# Patient Record
(demographics unavailable — no encounter records)

---

## 2024-11-07 NOTE — REASON FOR VISIT
[Follow-up] : a follow-up of an existing diagnosis [FreeTextEntry1] : For chronic epigastric pain and early satiety

## 2024-11-07 NOTE — REVIEW OF SYSTEMS
[As Noted in HPI] : as noted in HPI [Abdominal Pain] : abdominal pain [Negative] : Heme/Lymph [Vomiting] : no vomiting [Constipation] : no constipation [Diarrhea] : no diarrhea [Heartburn] : no heartburn [Melena (black stool)] : no melena [Bleeding] : no bleeding [Fecal Incontinence (soiling)] : no fecal incontinence [Bloating (gassiness)] : no bloating [FreeTextEntry7] : Early satiety.

## 2024-11-07 NOTE — ASSESSMENT
[FreeTextEntry1] : Impression: Chronic epigastric pain and early satiety with new onset thrombocytopenia rule out possible cirrhosis not actually seen on recent abdominal sonogram rule out possible ulcer disease and/or gastritis and/or upper GI neoplasm which is unlikely given the fact that he had a upper endoscopy done 6 months ago that was negative.  Recommendations: Patient is to be referred to hematology for further evaluation of his thrombocytopenia.  CT of the abdomen and pelvis with oral and IV contrast ordered for further evaluation of his symptoms to rule out possible abdominal weakness and/or cirrhosis.  Repeat EGD also advised for further evaluation of his new onset epigastric pain and early satiety.  Risk versus benefits of upper endoscopy and intravenous sedation, and alternative testing such as upper GI series, were individually explained to the patient today who appeared to understand all of the above and was agreeable to proceeding with repeat upper endoscopy.  His ASA classification is 2 and he is medically optimized for the proposed upper endoscopy and seemed to understand all of the above instructions, information, and management plan.

## 2024-11-07 NOTE — HISTORY OF PRESENT ILLNESS
[de-identified] : 6 months ago.  Negative EGD. [FreeTextEntry1] : 6 months ago.  Colonoscopy. [de-identified] : Heterogeneous coarsened echogenic hepatic echotexture.

## 2024-11-07 NOTE — PHYSICAL EXAM
[Alert] : alert [Normal Voice/Communication] : normal voice/communication [Healthy Appearing] : healthy appearing [No Acute Distress] : no acute distress [Sclera] : the sclera and conjunctiva were normal [Hearing Threshold Finger Rub Not Cherry] : hearing was normal [Normal Lips/Gums] : the lips and gums were normal [Oropharynx] : the oropharynx was normal [Normal Appearance] : the appearance of the neck was normal [No Neck Mass] : no neck mass was observed [No Respiratory Distress] : no respiratory distress [No Acc Muscle Use] : no accessory muscle use [Respiration, Rhythm And Depth] : normal respiratory rhythm and effort [Auscultation Breath Sounds / Voice Sounds] : lungs were clear to auscultation bilaterally [Heart Rate And Rhythm] : heart rate was normal and rhythm regular [Normal S1, S2] : normal S1 and S2 [Murmurs] : no murmurs [Bowel Sounds] : normal bowel sounds [Abdomen Tenderness] : non-tender [No Masses] : no abdominal mass palpated [Abdomen Soft] : soft [] : no hepatosplenomegaly [Oriented To Time, Place, And Person] : oriented to person, place, and time [Well Developed] : well developed [Well Nourished] : well nourished [No CVA Tenderness] : no CVA  tenderness [Abnormal Walk] : normal gait [No Joint Swelling] : no joint swelling seen [Normal Color / Pigmentation] : normal skin color and pigmentation [de-identified] : Deferred at this time